# Patient Record
Sex: FEMALE | Race: OTHER | HISPANIC OR LATINO | Employment: UNEMPLOYED | URBAN - METROPOLITAN AREA
[De-identification: names, ages, dates, MRNs, and addresses within clinical notes are randomized per-mention and may not be internally consistent; named-entity substitution may affect disease eponyms.]

---

## 2018-06-24 ENCOUNTER — APPOINTMENT (EMERGENCY)
Dept: CT IMAGING | Facility: HOSPITAL | Age: 23
End: 2018-06-24
Payer: COMMERCIAL

## 2018-06-24 ENCOUNTER — HOSPITAL ENCOUNTER (EMERGENCY)
Facility: HOSPITAL | Age: 23
Discharge: HOME | End: 2018-06-24
Attending: EMERGENCY MEDICINE | Admitting: EMERGENCY MEDICINE
Payer: COMMERCIAL

## 2018-06-24 VITALS
TEMPERATURE: 98 F | HEART RATE: 57 BPM | OXYGEN SATURATION: 100 % | RESPIRATION RATE: 18 BRPM | WEIGHT: 293 LBS | DIASTOLIC BLOOD PRESSURE: 61 MMHG | SYSTOLIC BLOOD PRESSURE: 123 MMHG

## 2018-06-24 DIAGNOSIS — K52.9 GASTROENTERITIS: Primary | ICD-10-CM

## 2018-06-24 LAB
ALBUMIN SERPL BCP-MCNC: 3.3 G/DL (ref 3.5–5)
ALP SERPL-CCNC: 90 U/L (ref 46–116)
ALT SERPL W P-5'-P-CCNC: 19 U/L (ref 12–78)
ANION GAP SERPL CALCULATED.3IONS-SCNC: 6 MMOL/L (ref 4–13)
AST SERPL W P-5'-P-CCNC: 15 U/L (ref 5–45)
BACTERIA UR QL AUTO: ABNORMAL /HPF
BASOPHILS # BLD AUTO: 0.02 THOUSANDS/ΜL (ref 0–0.1)
BASOPHILS NFR BLD AUTO: 0 % (ref 0–1)
BILIRUB SERPL-MCNC: 0.33 MG/DL (ref 0.2–1)
BILIRUB UR QL STRIP: NEGATIVE
BUN SERPL-MCNC: 17 MG/DL (ref 5–25)
CALCIUM SERPL-MCNC: 8.9 MG/DL (ref 8.3–10.1)
CHLORIDE SERPL-SCNC: 104 MMOL/L (ref 100–108)
CLARITY UR: ABNORMAL
CO2 SERPL-SCNC: 28 MMOL/L (ref 21–32)
COLOR UR: YELLOW
CREAT SERPL-MCNC: 0.92 MG/DL (ref 0.6–1.3)
EOSINOPHIL # BLD AUTO: 0.26 THOUSAND/ΜL (ref 0–0.61)
EOSINOPHIL NFR BLD AUTO: 2 % (ref 0–6)
ERYTHROCYTE [DISTWIDTH] IN BLOOD BY AUTOMATED COUNT: 15.1 % (ref 11.6–15.1)
EXT PREG TEST URINE: NEGATIVE
GFR SERPL CREATININE-BSD FRML MDRD: 87 ML/MIN/1.73SQ M
GLUCOSE SERPL-MCNC: 82 MG/DL (ref 65–140)
GLUCOSE UR STRIP-MCNC: NEGATIVE MG/DL
HCT VFR BLD AUTO: 37.5 % (ref 34.8–46.1)
HGB BLD-MCNC: 11.9 G/DL (ref 11.5–15.4)
HGB UR QL STRIP.AUTO: ABNORMAL
KETONES UR STRIP-MCNC: NEGATIVE MG/DL
LEUKOCYTE ESTERASE UR QL STRIP: ABNORMAL
LIPASE SERPL-CCNC: 124 U/L (ref 73–393)
LYMPHOCYTES # BLD AUTO: 2.88 THOUSANDS/ΜL (ref 0.6–4.47)
LYMPHOCYTES NFR BLD AUTO: 25 % (ref 14–44)
MCH RBC QN AUTO: 25.8 PG (ref 26.8–34.3)
MCHC RBC AUTO-ENTMCNC: 31.7 G/DL (ref 31.4–37.4)
MCV RBC AUTO: 81 FL (ref 82–98)
MONOCYTES # BLD AUTO: 0.69 THOUSAND/ΜL (ref 0.17–1.22)
MONOCYTES NFR BLD AUTO: 6 % (ref 4–12)
NEUTROPHILS # BLD AUTO: 7.55 THOUSANDS/ΜL (ref 1.85–7.62)
NEUTS SEG NFR BLD AUTO: 66 % (ref 43–75)
NITRITE UR QL STRIP: NEGATIVE
NON-SQ EPI CELLS URNS QL MICRO: ABNORMAL /HPF
NRBC BLD AUTO-RTO: 0 /100 WBCS
PH UR STRIP.AUTO: 5.5 [PH] (ref 4.5–8)
PLATELET # BLD AUTO: 274 THOUSANDS/UL (ref 149–390)
PMV BLD AUTO: 9.4 FL (ref 8.9–12.7)
POTASSIUM SERPL-SCNC: 3.9 MMOL/L (ref 3.5–5.3)
PROT SERPL-MCNC: 7.6 G/DL (ref 6.4–8.2)
PROT UR STRIP-MCNC: NEGATIVE MG/DL
RBC # BLD AUTO: 4.62 MILLION/UL (ref 3.81–5.12)
RBC #/AREA URNS AUTO: ABNORMAL /HPF
SODIUM SERPL-SCNC: 138 MMOL/L (ref 136–145)
SP GR UR STRIP.AUTO: 1.02 (ref 1–1.03)
UROBILINOGEN UR QL STRIP.AUTO: 0.2 E.U./DL
WBC # BLD AUTO: 11.4 THOUSAND/UL (ref 4.31–10.16)
WBC #/AREA URNS AUTO: ABNORMAL /HPF

## 2018-06-24 PROCEDURE — 96374 THER/PROPH/DIAG INJ IV PUSH: CPT

## 2018-06-24 PROCEDURE — 74177 CT ABD & PELVIS W/CONTRAST: CPT

## 2018-06-24 PROCEDURE — 87086 URINE CULTURE/COLONY COUNT: CPT

## 2018-06-24 PROCEDURE — 99284 EMERGENCY DEPT VISIT MOD MDM: CPT

## 2018-06-24 PROCEDURE — 83690 ASSAY OF LIPASE: CPT | Performed by: EMERGENCY MEDICINE

## 2018-06-24 PROCEDURE — 85025 COMPLETE CBC W/AUTO DIFF WBC: CPT | Performed by: EMERGENCY MEDICINE

## 2018-06-24 PROCEDURE — 36415 COLL VENOUS BLD VENIPUNCTURE: CPT | Performed by: EMERGENCY MEDICINE

## 2018-06-24 PROCEDURE — 96361 HYDRATE IV INFUSION ADD-ON: CPT

## 2018-06-24 PROCEDURE — 80053 COMPREHEN METABOLIC PANEL: CPT | Performed by: EMERGENCY MEDICINE

## 2018-06-24 PROCEDURE — 81025 URINE PREGNANCY TEST: CPT | Performed by: EMERGENCY MEDICINE

## 2018-06-24 PROCEDURE — 81001 URINALYSIS AUTO W/SCOPE: CPT

## 2018-06-24 RX ORDER — ONDANSETRON 2 MG/ML
4 INJECTION INTRAMUSCULAR; INTRAVENOUS ONCE
Status: COMPLETED | OUTPATIENT
Start: 2018-06-24 | End: 2018-06-24

## 2018-06-24 RX ORDER — MAGNESIUM HYDROXIDE/ALUMINUM HYDROXICE/SIMETHICONE 120; 1200; 1200 MG/30ML; MG/30ML; MG/30ML
20 SUSPENSION ORAL ONCE
Status: COMPLETED | OUTPATIENT
Start: 2018-06-24 | End: 2018-06-24

## 2018-06-24 RX ADMIN — LIDOCAINE HYDROCHLORIDE 15 ML: 20 SOLUTION ORAL; TOPICAL at 20:17

## 2018-06-24 RX ADMIN — ALUMINUM HYDROXIDE, MAGNESIUM HYDROXIDE, AND SIMETHICONE 20 ML: 200; 200; 20 SUSPENSION ORAL at 20:17

## 2018-06-24 RX ADMIN — ONDANSETRON 4 MG: 2 INJECTION INTRAMUSCULAR; INTRAVENOUS at 18:52

## 2018-06-24 RX ADMIN — SODIUM CHLORIDE 1000 ML: 0.9 INJECTION, SOLUTION INTRAVENOUS at 18:55

## 2018-06-24 RX ADMIN — IOHEXOL 100 ML: 350 INJECTION, SOLUTION INTRAVENOUS at 19:56

## 2018-06-25 LAB — BACTERIA UR CULT: NORMAL

## 2018-06-25 NOTE — ED PROVIDER NOTES
History  Chief Complaint   Patient presents with    Vomiting     with body aches since yesterday     23year old female presents for evaluation of epigastric abdominal pain, vomiting and diarrhea that started yesterday  She also reports generalized body aches, subjective fever and chills  Denies chest pain or SOB  Vomiting x6 yesterday and today with multiple watery stools  No sick contacts  Denies recent ingestion of seafood  No recent use of abx  No other PMH  Last meal was last night at 3am              None       History reviewed  No pertinent past medical history  History reviewed  No pertinent surgical history  History reviewed  No pertinent family history  I have reviewed and agree with the history as documented  Social History   Substance Use Topics    Smoking status: Never Smoker    Smokeless tobacco: Never Used    Alcohol use No        Review of Systems   Constitutional: Negative for chills, diaphoresis and fever  HENT: Negative for congestion and rhinorrhea  Eyes: Negative for pain and visual disturbance  Respiratory: Negative for cough, shortness of breath and wheezing  Cardiovascular: Negative for chest pain and leg swelling  Gastrointestinal: Positive for abdominal pain, diarrhea, nausea and vomiting  Genitourinary: Negative for difficulty urinating, dysuria, frequency and urgency  Musculoskeletal: Negative for back pain and neck pain  Skin: Negative for color change and rash  Neurological: Negative for syncope, numbness and headaches  All other systems reviewed and are negative        Physical Exam  ED Triage Vitals   Temperature Pulse Respirations Blood Pressure SpO2   06/24/18 1634 06/24/18 1634 06/24/18 1634 06/24/18 1634 06/24/18 1634   98 °F (36 7 °C) 77 18 125/72 99 %      Temp Source Heart Rate Source Patient Position - Orthostatic VS BP Location FiO2 (%)   06/24/18 1634 06/24/18 1920 06/24/18 1920 06/24/18 1920 --   Temporal Monitor Sitting Right arm Pain Score       06/24/18 1634       Worst Possible Pain           Orthostatic Vital Signs  Vitals:    06/24/18 1634 06/24/18 1920   BP: 125/72 123/61   Pulse: 77 57   Patient Position - Orthostatic VS:  Sitting       Physical Exam   Constitutional: She is oriented to person, place, and time  She appears well-developed and well-nourished  No distress  HENT:   Head: Normocephalic and atraumatic  Eyes: Conjunctivae and EOM are normal    Neck: Normal range of motion  Neck supple  Cardiovascular: Normal rate and regular rhythm  Pulmonary/Chest: Effort normal and breath sounds normal  No respiratory distress  She has no wheezes  She has no rales  Abdominal: Soft  Bowel sounds are normal  There is tenderness  There is no guarding  RUQ and epigastric tenderness   Musculoskeletal: Normal range of motion  She exhibits no edema or tenderness  Neurological: She is alert and oriented to person, place, and time  No cranial nerve deficit  Skin: Skin is warm  She is not diaphoretic  No erythema  Psychiatric: She has a normal mood and affect  Her behavior is normal    Nursing note and vitals reviewed        ED Medications  Medications   sodium chloride 0 9 % bolus 1,000 mL (0 mL Intravenous Stopped 6/24/18 2017)   ondansetron (ZOFRAN) injection 4 mg (4 mg Intravenous Given 6/24/18 1852)   aluminum-magnesium hydroxide-simethicone (MYLANTA) 200-200-20 mg/5 mL oral suspension 20 mL (20 mL Oral Given 6/24/18 2017)   lidocaine viscous (XYLOCAINE) 2 % mucosal solution 15 mL (15 mL Swish & Spit Given 6/24/18 2017)   iohexol (OMNIPAQUE) 350 MG/ML injection (MULTI-DOSE) 100 mL (100 mL Intravenous Given 6/24/18 1956)       Diagnostic Studies  Results Reviewed     Procedure Component Value Units Date/Time    Urine Microscopic [[id number]]  (Abnormal) Collected:  06/24/18 1921    Lab Status:  Final result Specimen:  Urine from Urine, Clean Catch Updated:  06/24/18 1936     RBC, UA None Seen /hpf      WBC, UA 10-20 (A) /hpf Epithelial Cells Occasional /hpf      Bacteria, UA Occasional /hpf     Urine culture [[id number]] Collected:  06/24/18 1921    Lab Status: In process Specimen:  Urine from Urine, Clean Catch Updated:  06/24/18 1935    POCT pregnancy, urine [[id number]]  (Normal) Resulted:  06/24/18 1918    Lab Status:  Final result Specimen:  Urine Updated:  06/24/18 1919     EXT PREG TEST UR (Ref: Negative) Negative    POCT urinalysis dipstick [[id number]]  (Abnormal) Resulted:  06/24/18 1918    Lab Status:  Final result Specimen:  Urine Updated:  06/24/18 1918    ED Urine Macroscopic [[id number]]  (Abnormal) Collected:  06/24/18 1921    Lab Status:  Final result Specimen:  Urine Updated:  06/24/18 1917     Color, UA Yellow     Clarity, UA Cloudy     pH, UA 5 5     Leukocytes, UA Small (A)     Nitrite, UA Negative     Protein, UA Negative mg/dl      Glucose, UA Negative mg/dl      Ketones, UA Negative mg/dl      Urobilinogen, UA 0 2 E U /dl      Bilirubin, UA Negative     Blood, UA Trace (A)     Specific [place], UA 1 025    Narrative:       CLINITEK RESULT    Comprehensive metabolic panel [[id number]]  (Abnormal) Collected:  06/24/18 1851    Lab Status:  Final result Specimen:  Blood from Arm, Left Updated:  06/24/18 1911     Sodium 138 mmol/L      Potassium 3 9 mmol/L      Chloride 104 mmol/L      CO2 28 mmol/L      Anion Gap 6 mmol/L      BUN 17 mg/dL      Creatinine 0 92 mg/dL      Glucose 82 mg/dL      Calcium 8 9 mg/dL      AST 15 U/L      ALT 19 U/L      Alkaline Phosphatase 90 U/L      Total Protein 7 6 g/dL      Albumin 3 3 (L) g/dL      Total Bilirubin 0 33 mg/dL      eGFR 87 ml/min/1 73sq m     Narrative:         National Kidney Disease Education Program recommendations are as follows:  GFR calculation is accurate only with a steady state creatinine  Chronic Kidney disease less than 60 ml/min/1 73 sq  meters  Kidney failure less than 15 ml/min/1 73 sq  meters      Lipase [[id number]]  (Normal) Collected:  06/24/18 1851    Lab Status:  Final result Specimen:  Blood from Arm, Left Updated:  06/24/18 1911     Lipase 124 u/L     CBC and differential [[id number]]  (Abnormal) Collected:  06/24/18 1850    Lab Status:  Final result Specimen:  Blood from Arm, Left Updated:  06/24/18 1857     WBC 11 40 (H) Thousand/uL      RBC 4 62 Million/uL      Hemoglobin 11 9 g/dL      Hematocrit 37 5 %      MCV 81 (L) fL      MCH 25 8 (L) pg      MCHC 31 7 g/dL      RDW 15 1 %      MPV 9 4 fL      Platelets 141 Thousands/uL      nRBC 0 /100 WBCs      Neutrophils Relative 66 %      Lymphocytes Relative 25 %      Monocytes Relative 6 %      Eosinophils Relative 2 %      Basophils Relative 0 %      Neutrophils Absolute 7 55 Thousands/µL      Lymphocytes Absolute 2 88 Thousands/µL      Monocytes Absolute 0 69 Thousand/µL      Eosinophils Absolute 0 26 Thousand/µL      Basophils Absolute 0 02 Thousands/µL                  CT abdomen pelvis with contrast   ED Interpretation by Vivienne Gale MD (06/24 2027)   No acute findings      Final Result by Wilfred Hyatt DO (06/24 2013)      No acute findings            Workstation performed: [id number]               Procedures  Procedures      Phone Consults  ED Phone Contact    ED Course                               MDM  Number of Diagnoses or Management Options  Gastroenteritis:   Diagnosis management comments: 23year old female presenting with abd pain, vomiting and diarrhea  Obtain labs, UA, Upreg, CTAP, fluids, symptom control  CritCare Time    Disposition  Final diagnoses:   Gastroenteritis     Time reflects when diagnosis was documented in both MDM as applicable and the Disposition within this note     Time User Action Codes Description Comment    6/24/2018  8:49 PM Tamara Yang Add [K52 9] Gastroenteritis       ED Disposition     ED Disposition Condition Comment    Discharge  Ramsey Patel discharge to home/self care      Condition at discharge: Stable        Follow-up Information     Follow up With Specialties Details Why Contact Info Additional Information    Aldair Berman MD Family Medicine In 2 days For reassessment [address] Emergency Department Emergency Medicine  If symptoms worsen [address] [facility]  [phone] [place] ED, [address] [facility] , [place], [place], [address]          There are no discharge medications for this patient  No discharge procedures on file  ED Provider  Attending physically available and evaluated Luis Felipe Rodgers I managed the patient along with the ED Attending      Electronically Signed by         Giovanna Benavides DO  06/24/18 7599

## 2018-08-13 ENCOUNTER — HOSPITAL ENCOUNTER (EMERGENCY)
Facility: HOSPITAL | Age: 23
Discharge: HOME | End: 2018-08-13
Attending: EMERGENCY MEDICINE | Admitting: EMERGENCY MEDICINE
Payer: COMMERCIAL

## 2018-08-13 VITALS
DIASTOLIC BLOOD PRESSURE: 63 MMHG | RESPIRATION RATE: 18 BRPM | OXYGEN SATURATION: 99 % | HEART RATE: 97 BPM | WEIGHT: 293 LBS | SYSTOLIC BLOOD PRESSURE: 152 MMHG | TEMPERATURE: 97.9 F

## 2018-08-13 DIAGNOSIS — H60.92 LEFT OTITIS EXTERNA: Primary | ICD-10-CM

## 2018-08-13 PROCEDURE — 99282 EMERGENCY DEPT VISIT SF MDM: CPT

## 2018-08-13 RX ORDER — ACETAMINOPHEN 325 MG/1
650 TABLET ORAL ONCE
Status: COMPLETED | OUTPATIENT
Start: 2018-08-13 | End: 2018-08-13

## 2018-08-13 RX ORDER — OFLOXACIN 3 MG/ML
10 SOLUTION AURICULAR (OTIC) DAILY
Qty: 10 ML | Refills: 0 | Status: SHIPPED | OUTPATIENT
Start: 2018-08-13 | End: 2018-08-20

## 2018-08-13 RX ADMIN — ACETAMINOPHEN 650 MG: 325 TABLET, FILM COATED ORAL at 20:46

## 2018-08-14 NOTE — DISCHARGE INSTRUCTIONS
Otitis externa   LO QUE NECESITA SABER:   La otitis externa u oído de nadador, es binta infección en el conducto auditivo externo  Desi conducto va desde la parte externa del oído hasta el tímpano  INSTRUCCIONES SOBRE [facility]:   Regrese a la riley de emergencias si:   · Usted tiene dolor intenso en el oído  · Usted de repente no puede oir  · Usted de tiene nueva inflamación en la tata, detrás de sandrine oídos o de schaefer kiara  · Usted repentinamente no puede  binta parte de schaefer tata  · Schaefer mary repentinamente se siente adormecido  Pregúntele a schaefer [place] vitaminas y minerales son adecuados para usted  · Usted tiene fiebre  · Sandrine signos y síntomas no mejoran después de 2 días de tratamiento  · Sandrine signos y síntomas desaparecen por un tiempo, marley después regresan  · Usted tiene preguntas o inquietudes acerca de schaefer condición o cuidado  Medicamentos:   · AINEs (Analgésicos antiinflamatorios no esteroides) josh el ibuprofeno, ayudan a disminuir la inflamación, el dolor y la fiebre  Desi medicamento esta disponible con o sin binta receta médica  Los AINEs pueden causar sangrado estomacal o problemas renales en ciertas personas  Si usted esta tomando un anticoágulante,  siempre  pregunte si los AINEs son seguros para usted  Siempre regina la etiqueta de desi medicamento y [place] instrucciones  No administre desi medicamento a niños menores de 6 meses de rafaela sin antes obtener la autorización de schaefer médico      · El acetaminofén  kayla el dolor y baja la fiebre  Está disponible sin receta médica  Pregunte la cantidad y la frecuencia con que debe tomarlos  [address]  El acetaminofén puede causar daño en el hígado cuando no se juan de forma correcta  · Gotas para los oídos  Podrían darle gotas para los oídosque contengan antibiótico  El antibiótico [place] a tratar binta infección bacteriana  Es posible que también le den medicamentos esteroideos   Los esteroides ayudan a disminuir el enrojecimiento, la inflamación y el dolor  · [place] paty medicamentos josh se le haya indicado  Consulte con rodriguez médico si usted ariana que rodriguez medicamento no le está ayudando o si presenta efectos secundarios  Infórmele si es alérgico a cualquier medicamento  Mantenga binta lista actualizada de los [place], las vitaminas y los productos herbales que juan  Incluya los siguientes datos de los medicamentos: cantidad, frecuencia y motivo de administración  Traiga con usted la lista o los envases de la píldoras a paty citas de seguimiento  Lleve la lista de los medicamentos con usted en maisha de binta emergencia  Acuda a paty consultas de control con rodriguez médico según le indicaron  Anote paty preguntas para que se acuerde de hacerlas yani paty visitas  [employer] bebe del oído:   · Acuéstese de lado con el oído infectado [place] arriba  · Coloque con cuidado el número correcto de gotas dentro del oído  Si es posible, pida ayuda a otra persona  · Con cuidado mueva la parte externa de rodriguez oreja hacia atrás y [place] adelante para ayudar a que el medicamento llegue hasta rodriguez canal auditivo  · Permanezca acostado en la misma posición (con el oído Page) de 3 a 5 minutos  Evite la otitis externa:   · No coloque hisopos de algodón u objetos extraños en paty oídos  · Envuelva un paño húmedo y limpio alrededor del dedo y úselo para limpiar la parte exterior de rodriguez oído y remover la cera adicional      · Use tapones en los oídos cuando nade  Seque la parte exterior de rodriguez oído completamente después de nadar o de bañarse  © 2017 [address] Information is for End User's use only and may not be sold, redistributed or otherwise used for commercial purposes  All illustrations and images included in CareNotes® are the copyrighted property of A D A M , Inc  or Marc Jarrett  Esta información es sólo para uso en educación   Rodriguez intención no es darle un consejo [employer] enfermedades o tratamientos  Colsulte con schaefer Elisha Cabot farmacéutico antes de seguir cualquier régimen médico para saber si es seguro y efectivo para usted

## 2018-08-14 NOTE — ED PROVIDER NOTES
History  Chief Complaint   Patient presents with    Earache     left side ear pressure since midnight  reports fever yest        24-year-old female presents for evaluation of left ear pain that started yesterday  She describes the pain as pressure-like and radiates to the face  She noticed some purulent drainage from the left ear  She admits to subjective fevers and states she felt warm  She took no medication prior to arrival   Associated symptoms include sore throat  No nasal congestion rhinorrhea coughing chest pain shortness breath abdominal pain nausea or vomiting  She denies any recent swimming  Denies any trauma to her ear  She has no other complaints at this time  History provided by:  Patient   used: Yes ([id number])        None       Past Medical History:   Diagnosis Date    No known health problems        Past Surgical History:   Procedure Laterality Date    NO PAST SURGERIES         History reviewed  No pertinent family history  I have reviewed and agree with the history as documented  Social History   Substance Use Topics    Smoking status: Never Smoker    Smokeless tobacco: Never Used    Alcohol use No        Review of Systems   Constitutional: Positive for chills and fever (subjective)  Negative for activity change and appetite change  HENT: Positive for ear discharge, ear pain and sore throat  Negative for congestion, facial swelling, rhinorrhea and trouble swallowing  Respiratory: Negative for shortness of breath  Cardiovascular: Negative for chest pain  Gastrointestinal: Negative for abdominal pain, nausea and vomiting  Physical Exam  Physical Exam   Constitutional: She is oriented to person, place, and time  She appears well-developed and well-nourished  Non-toxic appearance  She does not have a sickly appearance  She does not appear ill  No distress  Well appearing  On cell phone  HENT:   Head: Normocephalic and atraumatic     Right Ear: Hearing, tympanic membrane, external ear and ear canal normal    Left Ear: Hearing and external ear normal  There is drainage and tenderness  Nose: Nose normal    Mouth/Throat: Uvula is midline, oropharynx is clear and moist and mucous membranes are normal  No tonsillar exudate  Left external ear is normal in appearance without erythema or edema  There is tenderness with external manipulation of the left ear  Mastoid non tender without erythema  Left ear canal is erythematous and edematous with purulent drainage  TM is unable to be visualized  Eyes: Conjunctivae and EOM are normal  Pupils are equal, round, and reactive to light  Neck: Normal range of motion  Neck supple  Cardiovascular: Normal rate, regular rhythm and normal heart sounds  Exam reveals no gallop and no friction rub  No murmur heard  Pulmonary/Chest: Effort normal and breath sounds normal  No respiratory distress  She has no decreased breath sounds  She has no wheezes  She has no rhonchi  She has no rales  Musculoskeletal: Normal range of motion  Neurological: She is alert and oriented to person, place, and time  Skin: Skin is warm and dry  Capillary refill takes less than 2 seconds  She is not diaphoretic  Psychiatric: She has a normal mood and affect  Nursing note and vitals reviewed        Vital Signs  ED Triage Vitals [08/13/18 1946]   Temperature Pulse Respirations Blood Pressure SpO2   97 9 °F (36 6 °C) 97 18 152/63 99 %      Temp Source Heart Rate Source Patient Position - Orthostatic VS BP Location FiO2 (%)   Temporal -- Sitting Right arm --      Pain Score       Worst Possible Pain           Vitals:    08/13/18 1946   BP: 152/63   Pulse: 97   Patient Position - Orthostatic VS: Sitting       Visual Acuity      ED Medications  Medications   acetaminophen (TYLENOL) tablet 650 mg (650 mg Oral Given 8/13/18 2046)       Diagnostic Studies  Results Reviewed     None                 No orders to display Procedures  Procedures       Phone Contacts  ED Phone Contact    ED Course                               MDM  Number of Diagnoses or Management Options  Left otitis externa: new and does not require workup  Diagnosis management comments:   22 yo F with left ear pain  Otitis externa noted on exam  Will treat with ofloxacin otic drops  Advised motrin/tylenol for pain  Advised no qtips  Patient agreeable  Patient Progress  Patient progress: stable    CritCare Time    Disposition  Final diagnoses:   Left otitis externa     Time reflects when diagnosis was documented in both MDM as applicable and the Disposition within this note     Time User Action Codes Description Comment    8/13/2018  8:41 PM Zulema Hughes Add [S50 92] Left otitis externa       ED Disposition     ED Disposition Condition Comment    Discharge  Leva Michael RosarioPacheco discharge to home/self care  Condition at discharge: Good        Follow-up Information     Follow up With Specialties Details Why Contact Info Additional Information    Tracy Bergeron MD Family Medicine Schedule an appointment as soon as possible for a visit  [address] Emergency Department Emergency Medicine  If symptoms worsen- ear swelling, fevers [address] [place] [address]  [phone] [address] [place] [address] ED, [facility] , [address] [place], [place], [address]          Discharge Medication List as of 8/13/2018  8:42 PM      START taking these medications    Details   ofloxacin (FLOXIN) 0 3 % otic solution Administer 10 drops into the left ear daily for 7 days, Starting Mon 8/13/2018, Until Mon 8/20/2018, Print           No discharge procedures on file      ED Provider  Electronically Signed by           Diandra Presley PA-C  08/13/18 3053

## 2018-11-07 ENCOUNTER — HOSPITAL ENCOUNTER (EMERGENCY)
Facility: HOSPITAL | Age: 23
Discharge: HOME | End: 2018-11-07
Attending: EMERGENCY MEDICINE | Admitting: EMERGENCY MEDICINE
Payer: COMMERCIAL

## 2018-11-07 ENCOUNTER — APPOINTMENT (EMERGENCY)
Dept: RADIOLOGY | Facility: HOSPITAL | Age: 23
End: 2018-11-07
Payer: COMMERCIAL

## 2018-11-07 VITALS
TEMPERATURE: 98.2 F | HEART RATE: 98 BPM | DIASTOLIC BLOOD PRESSURE: 75 MMHG | WEIGHT: 293 LBS | SYSTOLIC BLOOD PRESSURE: 157 MMHG | OXYGEN SATURATION: 98 % | RESPIRATION RATE: 20 BRPM

## 2018-11-07 DIAGNOSIS — J20.9 ACUTE BRONCHITIS: Primary | ICD-10-CM

## 2018-11-07 LAB — EXT PREG TEST URINE: NEGATIVE

## 2018-11-07 PROCEDURE — 94640 AIRWAY INHALATION TREATMENT: CPT

## 2018-11-07 PROCEDURE — 81025 URINE PREGNANCY TEST: CPT | Performed by: PHYSICIAN ASSISTANT

## 2018-11-07 PROCEDURE — 99283 EMERGENCY DEPT VISIT LOW MDM: CPT

## 2018-11-07 PROCEDURE — 71046 X-RAY EXAM CHEST 2 VIEWS: CPT

## 2018-11-07 RX ORDER — ALBUTEROL SULFATE 90 UG/1
2 AEROSOL, METERED RESPIRATORY (INHALATION) ONCE
Status: COMPLETED | OUTPATIENT
Start: 2018-11-07 | End: 2018-11-07

## 2018-11-07 RX ORDER — PROMETHAZINE HYDROCHLORIDE AND CODEINE PHOSPHATE 6.25; 1 MG/5ML; MG/5ML
5 SYRUP ORAL EVERY 4 HOURS PRN
Qty: 120 ML | Refills: 0 | Status: SHIPPED | OUTPATIENT
Start: 2018-11-07 | End: 2018-11-17

## 2018-11-07 RX ORDER — IBUPROFEN 600 MG/1
600 TABLET ORAL ONCE
Status: COMPLETED | OUTPATIENT
Start: 2018-11-07 | End: 2018-11-07

## 2018-11-07 RX ORDER — IBUPROFEN 600 MG/1
600 TABLET ORAL EVERY 6 HOURS PRN
Qty: 30 TABLET | Refills: 0 | Status: SHIPPED | OUTPATIENT
Start: 2018-11-07 | End: 2018-11-17

## 2018-11-07 RX ORDER — ALBUTEROL SULFATE 2.5 MG/3ML
2.5 SOLUTION RESPIRATORY (INHALATION) ONCE
Status: COMPLETED | OUTPATIENT
Start: 2018-11-07 | End: 2018-11-07

## 2018-11-07 RX ORDER — ALBUTEROL SULFATE 90 UG/1
2 AEROSOL, METERED RESPIRATORY (INHALATION) EVERY 6 HOURS PRN
Qty: 1 INHALER | Refills: 0 | Status: SHIPPED | OUTPATIENT
Start: 2018-11-07 | End: 2018-11-17

## 2018-11-07 RX ADMIN — IBUPROFEN 600 MG: 600 TABLET ORAL at 23:06

## 2018-11-07 RX ADMIN — IPRATROPIUM BROMIDE 0.5 MG: 0.5 SOLUTION RESPIRATORY (INHALATION) at 22:31

## 2018-11-07 RX ADMIN — ALBUTEROL SULFATE 2 PUFF: 90 AEROSOL, METERED RESPIRATORY (INHALATION) at 23:06

## 2018-11-07 RX ADMIN — ALBUTEROL SULFATE 2.5 MG: 2.5 SOLUTION RESPIRATORY (INHALATION) at 22:31

## 2018-11-08 NOTE — ED PROVIDER NOTES
History  Chief Complaint   Patient presents with    Cough     2 days of asthma-like symptoms  Reports had been dx with asthma in [facility]  Denies taking any medication to relieve symptoms  Cough with green phlegm noted  Patient presents emergency department with cough congestion wheezing for the past 2 days  + sore throat and congestion  Mild subjective fevers  Patient has history of asthma but she does not any of her breathing treatments  Patient has been taking Robitussin without relief  She reports some thick green nasal discharge  When she coughs she has some discomfort chest in her chest feels tight  Pain with coughing  None       Past Medical History:   Diagnosis Date    No known health problems        Past Surgical History:   Procedure Laterality Date    NO PAST SURGERIES         History reviewed  No pertinent family history  I have reviewed and agree with the history as documented  Social History   Substance Use Topics    Smoking status: Never Smoker    Smokeless tobacco: Never Used    Alcohol use No        Review of Systems   All other systems reviewed and are negative  Physical Exam  Physical Exam   Constitutional: She appears well-developed and well-nourished  HENT:   Head: Normocephalic and atraumatic  Right Ear: External ear normal    Left Ear: External ear normal    Mouth/Throat: Oropharynx is clear and moist    Eyes: Conjunctivae and EOM are normal    Neck: Neck supple  Cardiovascular: Normal rate, regular rhythm and normal heart sounds  Pulmonary/Chest: She exhibits tenderness  Initially few scattered wheezes and diminished breath sounds with nebulizer and albuterol inhaler patient's breath sounds increased and the wheezing resolved  Patient has tenderness over her lateral ribs  Abdominal: Soft  Bowel sounds are normal    Musculoskeletal: She exhibits no edema  No calf tenderness or swelling  Neurological: She is alert     Skin: Skin is warm    Psychiatric: She has a normal mood and affect  Her behavior is normal    Nursing note and vitals reviewed  Vital Signs  ED Triage Vitals [11/07/18 2218]   Temperature Pulse Respirations Blood Pressure SpO2   98 2 °F (36 8 °C) (!) 107 20 157/75 100 %      Temp Source Heart Rate Source Patient Position - Orthostatic VS BP Location FiO2 (%)   Oral Monitor Sitting Right arm --      Pain Score       9           Vitals:    11/07/18 2218 11/07/18 2249 11/07/18 2325   BP: 157/75     Pulse: (!) 107 (!) 108 98   Patient Position - Orthostatic VS: Sitting         Visual Acuity      ED Medications  Medications   albuterol inhalation solution 2 5 mg (2 5 mg Nebulization Given 11/7/18 2231)   ipratropium (ATROVENT) 0 02 % inhalation solution 0 5 mg (0 5 mg Nebulization Given 11/7/18 2231)   ibuprofen (MOTRIN) tablet 600 mg (600 mg Oral Given 11/7/18 2306)   albuterol (PROVENTIL HFA,VENTOLIN HFA) inhaler 2 puff (2 puffs Inhalation Given 11/7/18 2306)       Diagnostic Studies  Results Reviewed     Procedure Component Value Units Date/Time    POCT pregnancy, urine [[id number]]  (Normal) Resulted:  11/07/18 2304    Lab Status:  Final result Updated:  11/07/18 2307     EXT PREG TEST UR (Ref: Negative) negative                 XR chest 2 views    (Results Pending)              Procedures  Procedures       Phone Contacts  ED Phone Contact    ED Course                               MDM  Number of Diagnoses or Management Options  Acute bronchitis: new and requires workup     Amount and/or Complexity of Data Reviewed  Independent visualization of images, tracings, or specimens: yes    Risk of Complications, Morbidity, and/or Mortality  General comments: Patient feeling better after breathing treatments  She coughed up a lot of phlegm while she was here and symptoms improved  Instructions reviewed with patient    Nothing in PA     Patient Progress  Patient progress: improved    CritCare Time    Disposition  Final diagnoses: Acute bronchitis     Time reflects when diagnosis was documented in both MDM as applicable and the Disposition within this note     Time User Action Codes Description Comment    11/7/2018 11:34 PM Aure Cool Add [J20 9] Acute bronchitis       ED Disposition     ED Disposition Condition Comment    Discharge  Kortney Matti Horn discharge to home/self care  Condition at discharge: Good        Follow-up Information     Follow up With Specialties Details Why Contact Info    [employer]    [phone]            Discharge Medication List as of 11/7/2018 11:38 PM      START taking these medications    Details   albuterol (PROVENTIL HFA,VENTOLIN HFA) 90 mcg/act inhaler Inhale 2 puffs every 6 (six) hours as needed for wheezing for up to 10 days, Starting Wed 11/7/2018, Until Sat 11/17/2018, Print      ibuprofen (MOTRIN) 600 mg tablet Take 1 tablet (600 mg total) by mouth every 6 (six) hours as needed for mild pain for up to 10 days, Starting Wed 11/7/2018, Until Sat 11/17/2018, Print      promethazine-codeine (PHENERGAN WITH CODEINE) 6 25-10 mg/5 mL syrup Take 5 mL by mouth every 4 (four) hours as needed for cough for up to 10 days, Starting Wed 11/7/2018, Until Sat 11/17/2018, Print           No discharge procedures on file      ED Provider  Electronically Signed by           No Dougherty PA-C  11/07/18 1723

## 2018-11-08 NOTE — DISCHARGE INSTRUCTIONS
Tylenol or Motrin for fevers/pain  Saline spray for congestion you may use Mucinex for cough and congestion increase, fluids follow-up with the family doctor  Return to the emergency department for worsening symptoms  Bronquitis aguda   LO QUE USTED DEBE SABER:   La bronquitis aguda es la inflamación e irritación de paty vías respiratorias  Esta irritación puede causar que tosa o tenga otros problemas de respiración  La bronquitis aguda suele comenzar a causa de otra enfermedad viral, josh un resfriado o la gripe  La enfermedad se propaga de schaefer nariz y garganta a schaefer tráquea y Michaelene Altagracia  La bronquitis con frecuencia se conoce josh un resfriado de pecho  La bronquitis aguda generalmente dura alrededor de 2 semanas y no es binta enfermedad grave  DESPUÉS DE SER DADO DE BARRETT:   Medicamentos:   · Ibuprofeno o acetaminofén:  El ibuprofeno o el acetaminofén son medicamentos que pueden ayudar a disminuir schaefer fiebre  Estos son disponibles sin binta receta médica  Pregúntele a schaefer médico cuál medicamento es adecuado para usted  Pregunte cuánto debe de vincent y cuán a menudo debe de tomarlo  Lists of hospitals in the [place] 645  Estos medicamentos pueden causar hemorragia intestinal si no son tomadas correctamente  El ibuprofeno puede causar daño a los riñones  No tome ibuprofeno si usted tienen enfermedad de los riñones, [place] o es alérgico a la aspirina  El acetaminofén puede causar daño al hígado  No tome alcohol si usted juan acetaminofén  · Medicamento para la tos:  [place] medicamento ayuda a soltar la mucosidad en paty pulmones y hacer más fácil de expectorar  [place] puede ayudarle a respirar mejor  · Inhaladores:  Schaefer médico podría darle shawn o más inhaladores para ayudarle a respirar más fácil y [place] menos tos  Un inhalador le da el medicamento en forma de michelle para que usted lo respire dentro de paty pulmones  [place] a schaefer médico que le enseñe josh usar schaefer inhalador correctamente  · Medicamento esteroide:   Un medicamento esteroide ayuda a abrir paty vías respiratorias para que pueda respirar mejor  · [place] paty medicamentos josh se le haya indicado  Llame a schaefer proveedor de nicanor si usted piensa que schaefer medicamento no le está ayudando o si tiene efectos secundarios  Infórmele si es alérgico a cualquier medicamento  Mantenga binta lista vigente de los medicamentos, vitaminas, y hierbas que juan  [address] cantidades, la frecuencia con que los juan y por qué los juan  Traiga la lista o los envases de paty medicamentos a las citas de seguimiento  Mantenga la lista consigo en maisha de binta emergencia  Bote las listas [place]  Cómo usar un inhalador:   · Agite jamir el inhalador para asegurarse de que reciba la cantidad correcta de medicamento por inhalación  [place] la tapa de la boquilla de schaefer inhalador  Si utiliza un espaciador, conecte schaefer inhalador al extremo plano del espaciador  · Exhale la mayor cantidad de aire de paty pulmones josh le sea posible  Coloque la boquilla en schaefer boca pasando paty dientes y repósela en la parte superior de schaefer lengua  No bloquee la abertura de la boquilla con schaefer lengua  · Respire por la boca de manera lenta y tico  Al hacer esto, oprima el inhalador para liberar la dosis de medicamento  Termine de respirar lentamente y profundamente mientras inhala el medicamento  Cuando paty pulmones estén llenos, contenga la respiración por 10 segundos  Luego exhale lentamente a través de labios fruncidos o por schaefer nariz  · Si usted necesita vincent más soplos o dosis del medicamento, espere por lo menos 1 minuto entre cada soplo  · Enjuague schaefer boca con agua después de usar el inhalador  [place] puede evitar que usted contraiga binta infección en la boca o irritación  · Siga las instrucciones que vienen con el inhalador para limpiarlo  Debe limpiar schaefer inhalador por lo menos binta vez por semana    Maneras de cuidarse a sí mismo:   · Evite el alcohol:  El alcohol disminuye schaefer necesidad de toser y estornudar  Cuando usted tiene bronquitis, tiene que ser Malachi Mast de toser y estornudar para despejar paty vías respiratorias  El alcohol también hace que schaefer cuerpo pierda líquidos  [place] puede hacer que la mucosidad de paty pulmones sea más gruesa y más difícil de expectorar  · Evite irritantes en el aire:  No fume ni permita que otros fumen alrededor suyo  Evite productos químicos, gases y polvo  Use binta mascarilla si tiene que trabajar rodeado de polvo o gases  Permanezca dentro cuando los niveles de contaminación son altos  Si usted tiene alergias, permanezca dentro cuando los conteos de polen son altos  Evite los productos en aerosoles  Estos incluyen desodorante en aerosol, repelente de insectos, y laca para el cristian  · [employer] líquidos:  La mayoría de las personas deben beber al menos 8 vasos de 236 mililitros (8 onzas) de agua al día  Usted podría necesitar beber más líquidos cuando tiene bronquitis [place]  Los líquidos ayudan a mantener las vías respiratorias húmedas y a que sea más fácil expectorar la mucosidad  · Obtenga más descanso:  Usted podría sentir deseos de descansar un poco más  Poco a poco empiece a hacer más cada día  Descanse cuando lo considere necesario  · Consuma alimentos saludables:  Coma binta variedad de alimentos saludables todos los días  Schaefer dieta debería incluir frutas, verduras, panes y proteínas (josh tayla, pescado y frijoles)  Los productos lácteos ([address] [place] [address], [facility] y [facility]) a veces pueden aumentar la cantidad de mucosidad que schaefer cuerpo produce  Pregunte si usted debe comer menos productos lácteos  · Use un humidificador:  Use un humidificador de michelle frío para aumentar la humedad del aire en schaefer hogar  [place] puede hacer que sea más fácil que usted pueda respirar, y ayudar a disminuir schaefer tos    Disminuya schaefer riesgo de bronquitis aguda:   · [address] vacunas necesarias:  Pregúntele a schaefer médico de cabecera si usted debería ser vacunado contra la gripe o la neumonía  · Evite cosas que podrían irritar sandrine pulmones:  Permanezca dentro o [place] schaefer boca y Cinderella Dach con binta bufanda cuando esté afuera yani el clima frío  Usted también debe permanecer dentro cuando los niveles de contaminación son altos  Si usted tiene alergias, permanezca dentro cuando los conteos de polen son altos  Evite los productos en aerosoles  Estos incluyen desodorante en aerosol, repelente de insectos, y laca para el cristian  · Evite la propagación de gérmenes:   Para disminuir schaefer riesgo de contraer infecciones pulmonares y [facility], usted puede hacer lo siguiente:    ¨ Lave sandrine fermín con frecuencia, usando agua y [place]  Lleve consigo binta loción, o gel, para las fermín que sirva para eliminar gérmenes  Usted puede usarla para desinfectar sandrine fermín en sitios donde no se consigue el agua  ¨ No toque sandrine ojos, nariz o boca sin haberse [employer] anai  ¨ Cubra schaefer boca siempre que tosa  Tosa sobre un pañuelo desechable o la manga de schaefer camisa para que no propague los gérmenes en sandrine fermín  ¨ Evite las [employer] tienen resfriados o gripe  Aléjese lo más posible de las personas, si usted esta enfermo  [place] binta regina de control con schaefer médico de cabecera según indicado: Anote sandrine preguntas para que acuerde preguntarlas yani sandrine citas de control  Comuníquese con schaefer médico de cabecera sí:   · Usted tiene fiebre  · Schaefer piel tiene picazón o usted desarrolla salpullido después de vincent schaefer medicamento  · Sandrine problemas respiratorios no desaparecen o empeoran  · Schaefer tos no mejora con el tratamiento  · Usted expectora tabatha  · Usted tiene preguntas o inquietudes acerca de schaefer condición o cuidado  Busque atención médica inmediatamente o llame al 911 sí:   · Se desmaya  · Sandrine labios o sandrine uñas están [place]  · Usted siente que no está recibiendo suficiente aire cuando respira      · Tiene inflamación de labios, lengua o garganta que le causa dificultad al respirar o tragar  © 2014 [address] is for End User's use only and may not be sold, redistributed or otherwise used for commercial purposes  All illustrations and images included in CareNotes® are the copyrighted property of A D A M , Inc  or Marc Jarrett  Esta información es sólo para uso en educación  Schaefer intención no es darle un consejo médico sobre enfermedades o tratamientos  Colsulte con schaefer [place] farmacéutico antes de seguir cualquier régimen médico para saber si es seguro y efectivo para usted